# Patient Record
Sex: MALE | Race: ASIAN | NOT HISPANIC OR LATINO
[De-identification: names, ages, dates, MRNs, and addresses within clinical notes are randomized per-mention and may not be internally consistent; named-entity substitution may affect disease eponyms.]

---

## 2018-02-20 ENCOUNTER — APPOINTMENT (OUTPATIENT)
Dept: OTOLARYNGOLOGY | Facility: CLINIC | Age: 63
End: 2018-02-20

## 2019-04-02 ENCOUNTER — APPOINTMENT (OUTPATIENT)
Dept: OTOLARYNGOLOGY | Facility: CLINIC | Age: 64
End: 2019-04-02
Payer: COMMERCIAL

## 2019-04-02 VITALS
HEART RATE: 58 BPM | SYSTOLIC BLOOD PRESSURE: 126 MMHG | DIASTOLIC BLOOD PRESSURE: 67 MMHG | WEIGHT: 164 LBS | HEIGHT: 66 IN | BODY MASS INDEX: 26.36 KG/M2

## 2019-04-02 DIAGNOSIS — G47.30 SLEEP APNEA, UNSPECIFIED: ICD-10-CM

## 2019-04-02 DIAGNOSIS — Z86.39 PERSONAL HISTORY OF OTHER ENDOCRINE, NUTRITIONAL AND METABOLIC DISEASE: ICD-10-CM

## 2019-04-02 DIAGNOSIS — K21.9 GASTRO-ESOPHAGEAL REFLUX DISEASE W/OUT ESOPHAGITIS: ICD-10-CM

## 2019-04-02 DIAGNOSIS — R09.81 NASAL CONGESTION: ICD-10-CM

## 2019-04-02 DIAGNOSIS — R68.89 OTHER GENERAL SYMPTOMS AND SIGNS: ICD-10-CM

## 2019-04-02 DIAGNOSIS — Z83.3 FAMILY HISTORY OF DIABETES MELLITUS: ICD-10-CM

## 2019-04-02 DIAGNOSIS — R09.82 POSTNASAL DRIP: ICD-10-CM

## 2019-04-02 DIAGNOSIS — R06.83 SNORING: ICD-10-CM

## 2019-04-02 DIAGNOSIS — J30.9 ALLERGIC RHINITIS, UNSPECIFIED: ICD-10-CM

## 2019-04-02 PROCEDURE — 99204 OFFICE O/P NEW MOD 45 MIN: CPT | Mod: 25

## 2019-04-02 PROCEDURE — 31575 DIAGNOSTIC LARYNGOSCOPY: CPT

## 2019-04-02 RX ORDER — CHROMIUM 200 MCG
TABLET ORAL
Refills: 0 | Status: ACTIVE | COMMUNITY

## 2019-04-02 RX ORDER — METFORMIN HYDROCHLORIDE 1000 MG/1
TABLET, FILM COATED ORAL
Refills: 0 | Status: ACTIVE | COMMUNITY

## 2019-04-02 RX ORDER — GLIPIZIDE 2.5 MG/1
TABLET ORAL
Refills: 0 | Status: ACTIVE | COMMUNITY

## 2019-04-02 RX ORDER — ESCITALOPRAM OXALATE 5 MG/1
TABLET, FILM COATED ORAL
Refills: 0 | Status: ACTIVE | COMMUNITY

## 2019-04-02 RX ORDER — FLUTICASONE PROPIONATE 50 UG/1
50 SPRAY, METERED NASAL DAILY
Qty: 1 | Refills: 11 | Status: ACTIVE | COMMUNITY
Start: 2019-04-02 | End: 1900-01-01

## 2019-04-02 RX ORDER — ATORVASTATIN CALCIUM 80 MG/1
TABLET, FILM COATED ORAL
Refills: 0 | Status: ACTIVE | COMMUNITY

## 2019-04-02 NOTE — REVIEW OF SYSTEMS
[Nasal Congestion] : nasal congestion [Problem Snoring] : problem snoring [Snoring With Pauses] : snoring with pauses [As Noted in HPI] : as noted in HPI [Throat Clearing] : throat clearing [Throat Dryness] : throat dryness [Negative] : Heme/Lymph

## 2019-04-03 PROBLEM — J30.9 ALLERGIC RHINITIS: Status: ACTIVE | Noted: 2019-04-02

## 2019-04-03 PROBLEM — K21.9 LARYNGOPHARYNGEAL REFLUX (LPR): Status: ACTIVE | Noted: 2019-04-02

## 2019-04-03 PROBLEM — R68.89 CHRONIC THROAT CLEARING: Status: ACTIVE | Noted: 2019-04-02

## 2019-04-03 NOTE — PROCEDURE
[Globus] : globus [Topical Lidocaine] : topical lidocaine [Oxymetazoline HCl] : oxymetazoline HCl [Flexible Endoscope] : examined with the flexible endoscope [Serial Number: ___] : Serial Number: [unfilled] [Normal] : the false vocal folds were pink and regular, the ventricular sulcus was open, the true vocal folds were glistening white, tense and of equal length, mobility, and height [True Vocal Cords Paralysis] : no true vocal cord paralysis [True Vocal Cords Erythematous] : no true vocal cord edema [True Vocal Cords New's Nodules] : no true vocal cord nodules [Glottis Arytenoid Cartilages] : no arytenoid granulomas [Glottis Arytenoid Cartilages Erythema] : bilateral arytenoid ~M erythema [Arytenoid Edema ___ /4] : arytenoid edema [unfilled]U/4 [Arytenoid Erythema ___ /4] : arytenoid erythema [unfilled]U/4 [Interarytenoid Edema] : interarytenoid area edematous [Present] : absent

## 2019-04-03 NOTE — REASON FOR VISIT
[Initial Evaluation] : an initial evaluation for [Other: _____] : [unfilled] [FreeTextEntry2] : Referred by PCP Dr. Vance for chronic nasal congestion, difficulty breathing and post nasal drip

## 2019-04-03 NOTE — CONSULT LETTER
[Dear  ___] : Dear  [unfilled], [Consult Letter:] : I had the pleasure of evaluating your patient, [unfilled]. [Please see my note below.] : Please see my note below. [Consult Closing:] : Thank you very much for allowing me to participate in the care of this patient.  If you have any questions, please do not hesitate to contact me. [Sincerely,] : Sincerely, [FreeTextEntry2] : Dr. Genia Vance\par [address] [facility] [address] [FreeTextEntry3] : Jesse Aguayo MD, FACS\par Clinical \par Dept. of Otolaryngology\par [facility]\par

## 2019-04-03 NOTE — PHYSICAL EXAM
[] : septum deviated to the right [Midline] : trachea located in midline position [Laryngoscopy Performed] : laryngoscopy was performed, see procedure section for findings [Normal] : no rashes [de-identified] : Pale and edematous [de-identified] : 1+

## 2019-04-03 NOTE — HISTORY OF PRESENT ILLNESS
[de-identified] : 64 year old male referred by PCP Dr. Vance for chronic nasal congestion, difficulty breathing and post nasal drip.  Patient reports symptoms present for about 20 years, progressively getting worse.  Denies use of allergy medication and nasal sprays, with no relief.  Patient states acquired headache with use of nasal sprays, no longer using.  Patient states he clears throat multiple times for the day with dry cough and globus sensation.  Patient denies nasal discharge, sinus pain/pressure and sinus infections in past 6 months.  Reports CT scan of head in 2018.

## 2019-04-10 ENCOUNTER — APPOINTMENT (OUTPATIENT)
Dept: UROLOGY | Facility: CLINIC | Age: 64
End: 2019-04-10
Payer: COMMERCIAL

## 2019-04-10 VITALS
HEART RATE: 71 BPM | SYSTOLIC BLOOD PRESSURE: 135 MMHG | BODY MASS INDEX: 26.36 KG/M2 | HEIGHT: 66 IN | DIASTOLIC BLOOD PRESSURE: 62 MMHG | WEIGHT: 164 LBS

## 2019-04-10 PROCEDURE — 99203 OFFICE O/P NEW LOW 30 MIN: CPT

## 2019-04-18 LAB
LH SERPL-ACNC: 1.5 IU/L
PROLACTIN SERPL-MCNC: 9 NG/ML

## 2019-04-23 LAB
TESTOST BND SERPL-MCNC: 8.9 PG/ML
TESTOST SERPL-MCNC: 484.1 NG/DL

## 2019-04-23 NOTE — ASSESSMENT
[FreeTextEntry1] : Discussed etiology and management of erectile dysfunction. We discussed that etiology of ED may be multifactorial. \par We discussed treatment options including oral medication with [id number] (Cialis, Viagra, etc), Vacuum erectile device pump (ZAID), intraurethral suppository (MUSE), cavernosal injection therapy (Caverject, Trimix, etc), and lastly, surgical options (penile prosthesis). \par risks and benefits of each reviewed\par check hormone profile and f/u

## 2019-04-23 NOTE — HISTORY OF PRESENT ILLNESS
[FreeTextEntry1] : Cc ed\par Erectile Dysfunction\par Patient complains of erectile dysfunction. Onset of dysfunction was  years ago and was gradual in onset.  Patient states the nature of difficulty is both attaining and maintaining erection. Full erections occur never. Partial erections occur never. Libido is  affected. Risk factors for ED include antihypertensive medications, dm lexapro, hyperlipid. Patient denies history of cardiovascular disease a. Patient's expectations as to sexual function good.  . Previous treatment of ED includes none\par

## 2019-04-24 ENCOUNTER — APPOINTMENT (OUTPATIENT)
Dept: UROLOGY | Facility: CLINIC | Age: 64
End: 2019-04-24
Payer: COMMERCIAL

## 2019-04-24 VITALS — BODY MASS INDEX: 27 KG/M2 | HEIGHT: 66 IN | WEIGHT: 168 LBS

## 2019-04-24 VITALS — DIASTOLIC BLOOD PRESSURE: 68 MMHG | HEART RATE: 60 BPM | SYSTOLIC BLOOD PRESSURE: 121 MMHG

## 2019-04-24 PROCEDURE — 99213 OFFICE O/P EST LOW 20 MIN: CPT

## 2019-04-24 RX ORDER — SILDENAFIL 20 MG/1
20 TABLET ORAL
Qty: 30 | Refills: 1 | Status: ACTIVE | COMMUNITY
Start: 2019-04-24 | End: 1900-01-01

## 2019-04-24 NOTE — ASSESSMENT
[FreeTextEntry1] : reviewed labs \par Will try sildenafil side effects reviewed\par More common reviewed- redness or warmth in your face, neck, or chest; cold symptoms such as stuffy nose, sneezing, or sore throat; headache; memory problems; diarrhea, upset stomach; or muscle pain, back pain.\par Stop immediately and got to ER for changes in vision or sudden vision loss; ringing in your ears, or sudden hearing loss; chest pain or heavy feeling, pain spreading to the arm or shoulder, nausea, sweating, general ill feeling; irregular heartbeat; shortness of breath, swelling in your hands or feet; seizure (convulsions); feeling light-headed, fainting; or penis erection that is painful or lasts 4 hours or longer\par

## 2019-04-24 NOTE — HISTORY OF PRESENT ILLNESS
[FreeTextEntry1] : Cc ed\par Erectile Dysfunction\par Patient complains of erectile dysfunction. Onset of dysfunction was  years ago and was gradual in onset.  Patient states the nature of difficulty is both attaining and maintaining erection. Full erections occur never. Partial erections occur never. Libido is  affected. Risk factors for ED include antihypertensive medications, dm lexapro, hyperlipid. Patient denies history of cardiovascular disease a. Patient's expectations as to sexual function good.  . Previous treatment of ED includes none\par labs normal \par

## 2019-05-07 ENCOUNTER — APPOINTMENT (OUTPATIENT)
Dept: OTOLARYNGOLOGY | Facility: CLINIC | Age: 64
End: 2019-05-07

## 2020-03-29 ENCOUNTER — INPATIENT (INPATIENT)
Facility: HOSPITAL | Age: 65
LOS: 3 days | Discharge: HOME | End: 2020-04-02
Attending: INTERNAL MEDICINE | Admitting: INTERNAL MEDICINE
Payer: COMMERCIAL

## 2020-03-29 VITALS
RESPIRATION RATE: 22 BRPM | SYSTOLIC BLOOD PRESSURE: 162 MMHG | OXYGEN SATURATION: 96 % | DIASTOLIC BLOOD PRESSURE: 75 MMHG | TEMPERATURE: 103 F | HEART RATE: 89 BPM

## 2020-03-29 NOTE — ED ADULT TRIAGE NOTE - CHIEF COMPLAINT QUOTE
PT C/O fevers, cough x 1 week. Right sided chest pain radiating to right flank and back x 1 day. PHX: DM, HLD. Denies recent travel, states son at home has similar symptoms. PT C/O fevers, cough x 1 week. Right sided chest pain radiating to right flank and back x 1 day. PHX: DM, HLD. Denies recent travel, states son at home has similar symptoms.  Addendum: PT short of breath after ambulating/talking, having difficulty completing full sentences. Charge RN notified: Pt moved to Adult ED intake.

## 2020-03-30 DIAGNOSIS — E11.9 TYPE 2 DIABETES MELLITUS WITHOUT COMPLICATIONS: ICD-10-CM

## 2020-03-30 DIAGNOSIS — Z02.9 ENCOUNTER FOR ADMINISTRATIVE EXAMINATIONS, UNSPECIFIED: ICD-10-CM

## 2020-03-30 DIAGNOSIS — Z29.9 ENCOUNTER FOR PROPHYLACTIC MEASURES, UNSPECIFIED: ICD-10-CM

## 2020-03-30 DIAGNOSIS — E78.5 HYPERLIPIDEMIA, UNSPECIFIED: ICD-10-CM

## 2020-03-30 DIAGNOSIS — J06.9 ACUTE UPPER RESPIRATORY INFECTION, UNSPECIFIED: ICD-10-CM

## 2020-03-30 DIAGNOSIS — B34.9 VIRAL INFECTION, UNSPECIFIED: ICD-10-CM

## 2020-03-30 DIAGNOSIS — R74.0 NONSPECIFIC ELEVATION OF LEVELS OF TRANSAMINASE AND LACTIC ACID DEHYDROGENASE [LDH]: ICD-10-CM

## 2020-03-30 LAB
ALBUMIN SERPL ELPH-MCNC: 3.4 G/DL — SIGNIFICANT CHANGE UP (ref 3.3–5)
ALP SERPL-CCNC: 63 U/L — SIGNIFICANT CHANGE UP (ref 40–120)
ALT FLD-CCNC: 69 U/L — HIGH (ref 4–41)
ANION GAP SERPL CALC-SCNC: 12 MMO/L — SIGNIFICANT CHANGE UP (ref 7–14)
ANION GAP SERPL CALC-SCNC: 13 MMO/L — SIGNIFICANT CHANGE UP (ref 7–14)
AST SERPL-CCNC: 80 U/L — HIGH (ref 4–40)
B PERT DNA SPEC QL NAA+PROBE: NOT DETECTED — SIGNIFICANT CHANGE UP
BASE EXCESS BLDV CALC-SCNC: -0.9 MMOL/L — SIGNIFICANT CHANGE UP
BASE EXCESS BLDV CALC-SCNC: -1.3 MMOL/L — SIGNIFICANT CHANGE UP
BILIRUB SERPL-MCNC: 0.5 MG/DL — SIGNIFICANT CHANGE UP (ref 0.2–1.2)
BLOOD GAS VENOUS - CREATININE: 1.23 MG/DL — SIGNIFICANT CHANGE UP (ref 0.5–1.3)
BLOOD GAS VENOUS - CREATININE: 1.36 MG/DL — HIGH (ref 0.5–1.3)
BLOOD GAS VENOUS - FIO2: 36 — SIGNIFICANT CHANGE UP
BUN SERPL-MCNC: 15 MG/DL — SIGNIFICANT CHANGE UP (ref 7–23)
BUN SERPL-MCNC: 15 MG/DL — SIGNIFICANT CHANGE UP (ref 7–23)
C PNEUM DNA SPEC QL NAA+PROBE: NOT DETECTED — SIGNIFICANT CHANGE UP
CALCIUM SERPL-MCNC: 8.5 MG/DL — SIGNIFICANT CHANGE UP (ref 8.4–10.5)
CALCIUM SERPL-MCNC: 9.1 MG/DL — SIGNIFICANT CHANGE UP (ref 8.4–10.5)
CHLORIDE BLDV-SCNC: 106 MMOL/L — SIGNIFICANT CHANGE UP (ref 96–108)
CHLORIDE BLDV-SCNC: 108 MMOL/L — SIGNIFICANT CHANGE UP (ref 96–108)
CHLORIDE SERPL-SCNC: 103 MMOL/L — SIGNIFICANT CHANGE UP (ref 98–107)
CHLORIDE SERPL-SCNC: 98 MMOL/L — SIGNIFICANT CHANGE UP (ref 98–107)
CO2 SERPL-SCNC: 20 MMOL/L — LOW (ref 22–31)
CO2 SERPL-SCNC: 20 MMOL/L — LOW (ref 22–31)
CREAT SERPL-MCNC: 0.98 MG/DL — SIGNIFICANT CHANGE UP (ref 0.5–1.3)
CREAT SERPL-MCNC: 1.14 MG/DL — SIGNIFICANT CHANGE UP (ref 0.5–1.3)
FLUAV H1 2009 PAND RNA SPEC QL NAA+PROBE: NOT DETECTED — SIGNIFICANT CHANGE UP
FLUAV H1 RNA SPEC QL NAA+PROBE: NOT DETECTED — SIGNIFICANT CHANGE UP
FLUAV H3 RNA SPEC QL NAA+PROBE: NOT DETECTED — SIGNIFICANT CHANGE UP
FLUAV SUBTYP SPEC NAA+PROBE: NOT DETECTED — SIGNIFICANT CHANGE UP
FLUBV RNA SPEC QL NAA+PROBE: NOT DETECTED — SIGNIFICANT CHANGE UP
GAS PNL BLDV: 136 MMOL/L — SIGNIFICANT CHANGE UP (ref 136–146)
GAS PNL BLDV: 136 MMOL/L — SIGNIFICANT CHANGE UP (ref 136–146)
GLUCOSE BLDV-MCNC: 109 MG/DL — HIGH (ref 70–99)
GLUCOSE BLDV-MCNC: 116 MG/DL — HIGH (ref 70–99)
GLUCOSE SERPL-MCNC: 109 MG/DL — HIGH (ref 70–99)
GLUCOSE SERPL-MCNC: 116 MG/DL — HIGH (ref 70–99)
HADV DNA SPEC QL NAA+PROBE: NOT DETECTED — SIGNIFICANT CHANGE UP
HCO3 BLDV-SCNC: 22 MMOL/L — SIGNIFICANT CHANGE UP (ref 20–27)
HCO3 BLDV-SCNC: 22 MMOL/L — SIGNIFICANT CHANGE UP (ref 20–27)
HCOV PNL SPEC NAA+PROBE: SIGNIFICANT CHANGE UP
HCT VFR BLD CALC: 35.6 % — LOW (ref 39–50)
HCT VFR BLDV CALC: 32.9 % — LOW (ref 39–51)
HCT VFR BLDV CALC: 38.3 % — LOW (ref 39–51)
HGB BLD-MCNC: 11.9 G/DL — LOW (ref 13–17)
HGB BLDV-MCNC: 10.7 G/DL — LOW (ref 13–17)
HGB BLDV-MCNC: 12.4 G/DL — LOW (ref 13–17)
HMPV RNA SPEC QL NAA+PROBE: NOT DETECTED — SIGNIFICANT CHANGE UP
HPIV1 RNA SPEC QL NAA+PROBE: NOT DETECTED — SIGNIFICANT CHANGE UP
HPIV2 RNA SPEC QL NAA+PROBE: NOT DETECTED — SIGNIFICANT CHANGE UP
HPIV3 RNA SPEC QL NAA+PROBE: NOT DETECTED — SIGNIFICANT CHANGE UP
HPIV4 RNA SPEC QL NAA+PROBE: NOT DETECTED — SIGNIFICANT CHANGE UP
LACTATE BLDV-MCNC: 1.6 MMOL/L — SIGNIFICANT CHANGE UP (ref 0.5–2)
LACTATE BLDV-MCNC: 2.7 MMOL/L — HIGH (ref 0.5–2)
MAGNESIUM SERPL-MCNC: 1.9 MG/DL — SIGNIFICANT CHANGE UP (ref 1.6–2.6)
MCHC RBC-ENTMCNC: 29.6 PG — SIGNIFICANT CHANGE UP (ref 27–34)
MCHC RBC-ENTMCNC: 33.4 % — SIGNIFICANT CHANGE UP (ref 32–36)
MCV RBC AUTO: 88.6 FL — SIGNIFICANT CHANGE UP (ref 80–100)
NRBC # FLD: 0 K/UL — SIGNIFICANT CHANGE UP (ref 0–0)
PCO2 BLDV: 43 MMHG — SIGNIFICANT CHANGE UP (ref 41–51)
PCO2 BLDV: 49 MMHG — SIGNIFICANT CHANGE UP (ref 41–51)
PH BLDV: 7.32 PH — SIGNIFICANT CHANGE UP (ref 7.32–7.43)
PH BLDV: 7.36 PH — SIGNIFICANT CHANGE UP (ref 7.32–7.43)
PHOSPHATE SERPL-MCNC: 2.2 MG/DL — LOW (ref 2.5–4.5)
PLATELET # BLD AUTO: 156 K/UL — SIGNIFICANT CHANGE UP (ref 150–400)
PMV BLD: 11.6 FL — SIGNIFICANT CHANGE UP (ref 7–13)
PO2 BLDV: 31 MMHG — LOW (ref 35–40)
PO2 BLDV: < 24 MMHG — LOW (ref 35–40)
POTASSIUM BLDV-SCNC: 3.8 MMOL/L — SIGNIFICANT CHANGE UP (ref 3.4–4.5)
POTASSIUM BLDV-SCNC: 3.9 MMOL/L — SIGNIFICANT CHANGE UP (ref 3.4–4.5)
POTASSIUM SERPL-MCNC: 4.2 MMOL/L — SIGNIFICANT CHANGE UP (ref 3.5–5.3)
POTASSIUM SERPL-MCNC: 6 MMOL/L — HIGH (ref 3.5–5.3)
POTASSIUM SERPL-SCNC: 4.2 MMOL/L — SIGNIFICANT CHANGE UP (ref 3.5–5.3)
POTASSIUM SERPL-SCNC: 6 MMOL/L — HIGH (ref 3.5–5.3)
PROT SERPL-MCNC: 6.5 G/DL — SIGNIFICANT CHANGE UP (ref 6–8.3)
RBC # BLD: 4.02 M/UL — LOW (ref 4.2–5.8)
RBC # FLD: 11.9 % — SIGNIFICANT CHANGE UP (ref 10.3–14.5)
RSV RNA SPEC QL NAA+PROBE: NOT DETECTED — SIGNIFICANT CHANGE UP
RV+EV RNA SPEC QL NAA+PROBE: NOT DETECTED — SIGNIFICANT CHANGE UP
SAO2 % BLDV: 35 % — LOW (ref 60–85)
SAO2 % BLDV: 50.8 % — LOW (ref 60–85)
SARS-COV-2 RNA SPEC QL NAA+PROBE: DETECTED
SODIUM SERPL-SCNC: 131 MMOL/L — LOW (ref 135–145)
SODIUM SERPL-SCNC: 135 MMOL/L — SIGNIFICANT CHANGE UP (ref 135–145)
WBC # BLD: 8.31 K/UL — SIGNIFICANT CHANGE UP (ref 3.8–10.5)
WBC # FLD AUTO: 8.31 K/UL — SIGNIFICANT CHANGE UP (ref 3.8–10.5)

## 2020-03-30 PROCEDURE — 71045 X-RAY EXAM CHEST 1 VIEW: CPT | Mod: 26

## 2020-03-30 PROCEDURE — 99221 1ST HOSP IP/OBS SF/LOW 40: CPT

## 2020-03-30 RX ORDER — ACETAMINOPHEN 500 MG
975 TABLET ORAL ONCE
Refills: 0 | Status: COMPLETED | OUTPATIENT
Start: 2020-03-30 | End: 2020-03-30

## 2020-03-30 RX ORDER — INSULIN LISPRO 100/ML
VIAL (ML) SUBCUTANEOUS AT BEDTIME
Refills: 0 | Status: DISCONTINUED | OUTPATIENT
Start: 2020-03-30 | End: 2020-04-02

## 2020-03-30 RX ORDER — ACETAMINOPHEN 500 MG
650 TABLET ORAL EVERY 6 HOURS
Refills: 0 | Status: DISCONTINUED | OUTPATIENT
Start: 2020-03-30 | End: 2020-04-02

## 2020-03-30 RX ORDER — ACETAMINOPHEN 500 MG
650 TABLET ORAL ONCE
Refills: 0 | Status: COMPLETED | OUTPATIENT
Start: 2020-03-30 | End: 2020-03-30

## 2020-03-30 RX ORDER — ENOXAPARIN SODIUM 100 MG/ML
40 INJECTION SUBCUTANEOUS EVERY 24 HOURS
Refills: 0 | Status: DISCONTINUED | OUTPATIENT
Start: 2020-03-30 | End: 2020-04-02

## 2020-03-30 RX ORDER — DEXTROSE 50 % IN WATER 50 %
12.5 SYRINGE (ML) INTRAVENOUS ONCE
Refills: 0 | Status: DISCONTINUED | OUTPATIENT
Start: 2020-03-30 | End: 2020-04-02

## 2020-03-30 RX ORDER — METFORMIN HYDROCHLORIDE 850 MG/1
1 TABLET ORAL
Qty: 0 | Refills: 0 | DISCHARGE

## 2020-03-30 RX ORDER — INSULIN LISPRO 100/ML
VIAL (ML) SUBCUTANEOUS
Refills: 0 | Status: DISCONTINUED | OUTPATIENT
Start: 2020-03-30 | End: 2020-04-02

## 2020-03-30 RX ORDER — DEXTROSE 50 % IN WATER 50 %
25 SYRINGE (ML) INTRAVENOUS ONCE
Refills: 0 | Status: DISCONTINUED | OUTPATIENT
Start: 2020-03-30 | End: 2020-04-02

## 2020-03-30 RX ORDER — SODIUM CHLORIDE 9 MG/ML
1000 INJECTION, SOLUTION INTRAVENOUS
Refills: 0 | Status: DISCONTINUED | OUTPATIENT
Start: 2020-03-30 | End: 2020-04-02

## 2020-03-30 RX ORDER — FENOFIBRATE,MICRONIZED 130 MG
0 CAPSULE ORAL
Qty: 0 | Refills: 0 | DISCHARGE

## 2020-03-30 RX ORDER — ESCITALOPRAM OXALATE 10 MG/1
1 TABLET, FILM COATED ORAL
Qty: 0 | Refills: 0 | DISCHARGE

## 2020-03-30 RX ORDER — DEXTROSE 50 % IN WATER 50 %
15 SYRINGE (ML) INTRAVENOUS ONCE
Refills: 0 | Status: DISCONTINUED | OUTPATIENT
Start: 2020-03-30 | End: 2020-04-02

## 2020-03-30 RX ORDER — SODIUM CHLORIDE 9 MG/ML
1000 INJECTION INTRAMUSCULAR; INTRAVENOUS; SUBCUTANEOUS ONCE
Refills: 0 | Status: COMPLETED | OUTPATIENT
Start: 2020-03-30 | End: 2020-03-30

## 2020-03-30 RX ORDER — GLUCAGON INJECTION, SOLUTION 0.5 MG/.1ML
1 INJECTION, SOLUTION SUBCUTANEOUS ONCE
Refills: 0 | Status: DISCONTINUED | OUTPATIENT
Start: 2020-03-30 | End: 2020-04-02

## 2020-03-30 RX ORDER — ESCITALOPRAM OXALATE 10 MG/1
10 TABLET, FILM COATED ORAL DAILY
Refills: 0 | Status: DISCONTINUED | OUTPATIENT
Start: 2020-03-30 | End: 2020-04-02

## 2020-03-30 RX ADMIN — Medication 100 MILLIGRAM(S): at 23:26

## 2020-03-30 RX ADMIN — SODIUM CHLORIDE 1000 MILLILITER(S): 9 INJECTION INTRAMUSCULAR; INTRAVENOUS; SUBCUTANEOUS at 08:53

## 2020-03-30 RX ADMIN — Medication 650 MILLIGRAM(S): at 02:05

## 2020-03-30 RX ADMIN — SODIUM CHLORIDE 1000 MILLILITER(S): 9 INJECTION INTRAMUSCULAR; INTRAVENOUS; SUBCUTANEOUS at 04:50

## 2020-03-30 NOTE — H&P ADULT - PROBLEM SELECTOR PLAN 3
Holding home oral agents, ISS and glucose checks at this time Uncertain of home fenofibrate dose, pt attempting to clarify, resume once dose clarified.    #Depression/anxiety: No acute issues, continue home escitalopram 10mg qd.

## 2020-03-30 NOTE — ED PROVIDER NOTE - CLINICAL SUMMARY MEDICAL DECISION MAKING FREE TEXT BOX
64 y/o M with DM, HLD presenting with flu like symptoms, x 1 week. on presentations patient w/ low grade fever, rest of vitals stable, walking and resting O2 sat >96%. patient has intense coughing bouts, making it difficult to talk. plan for routine labs including COVID, CXR,, cough suppressant and reassessment

## 2020-03-30 NOTE — H&P ADULT - PROBLEM SELECTOR PLAN 1
Presented with fever, dry cough. CXR clear. RVP negative.  - COVID19 sent, follow up result  - Hemodynamically stable, not requiring supplemental O2, continue to monitor

## 2020-03-30 NOTE — H&P ADULT - HISTORY OF PRESENT ILLNESS
65 year old male with DM2, HLD presents to the ED with fever and cough for about 4 days. Patient reports sick contacts at his home with URI symptoms but uncertain if they have COVID or flu or other respiratory illness. Currently denies shortness of breath, complains of continued intermittent nonproductive cough.     ED course:  1L NS bolus, acetaminophen

## 2020-03-30 NOTE — H&P ADULT - PROBLEM SELECTOR PLAN 6
Transitions of Care Status:  1.  Name of PCP: Dr. Vance  2.  PCP Contacted on Admission: [ ] Y    [x] N    3.  PCP contacted at Discharge: [ ] Y    [ ] N    [ ] N/A  4.  Post-Discharge Appointment Date and Location:  5.  Summary of Handoff given to PCP:

## 2020-03-30 NOTE — ED ADULT NURSE REASSESSMENT NOTE - NS ED NURSE REASSESS COMMENT FT1
pt received in CDU 7 per night shift RN. upon assessment pt AOx4, ambulatory, v/s stable, bilateral upper and lower extremities strength equal, no pitting edema present,  abdomen tender, pt denies pain at rest. ambulated pt approximately 100 ft and O2 maintained at 95-96% until last 5 step pt O2 at 94% room air. safety measures in place. will continue to monitor. pt received in CDU 7 per night shift RN. upon assessment pt AOx4, ambulatory, v/s stable, breathing equal and un labored, bilateral upper and lower extremities strength equal, no pitting edema present,  abdomen tender, pt denies pain at rest. ambulated pt approximately 100 ft and O2 maintained at 95-96% until last 5 step pt O2 at 94% room air. safety measures in place. will continue to monitor. pt received in CDU 7 per night shift RN. upon assessment pt AOx4, ambulatory, PMH asthma, HLD, DM. pt  v/s stable, breathing equal and un labored, bilateral upper and lower extremities strength equal, no pitting edema present,  abdomen tender, pt denies pain at rest. ambulated pt approximately 100 ft and O2 maintained at 95-96% until last 5 step pt O2 at 94% room air. safety measures in place. will continue to monitor. pt received in CDU 7 per night shift RN. upon assessment pt AOx4, ambulatory, PMH asthma, HLD, DM. pt  v/s stable, breathing equal and un labored, bilateral posterior upper lungs clear, posterior right lower diminished, posterior lower Left rhonchi on auscultation. bilateral upper and lower extremities strength equal, no pitting edema present,  abdomen tender, pt denies pain at rest. ambulated pt approximately 100 ft and O2 maintained at 95-96% until last 5 step pt O2 at 94% room air. safety measures in place. will continue to monitor.

## 2020-03-30 NOTE — ED PROVIDER NOTE - OBJECTIVE STATEMENT
patient is a 66 y/o M with pmhx of fever, right sided chest pain that radiates to his back that is aggravated by cough x few days. He denies recent travel or sick contacts, but endorses everyone in his house is sick. He denies chest pain at rest, HA, dizziness, palpitation, diaphoresis, SOB, abd pain n/v.

## 2020-03-30 NOTE — H&P ADULT - PROBLEM SELECTOR PLAN 5
Transitions of Care Status:  1.  Name of PCP: Dr. Vance  2.  PCP Contacted on Admission: [ ] Y    [x] N    3.  PCP contacted at Discharge: [ ] Y    [ ] N    [ ] N/A  4.  Post-Discharge Appointment Date and Location:  5.  Summary of Handoff given to PCP: Lovenox subcutaneous

## 2020-03-30 NOTE — H&P ADULT - NSHPREVIEWOFSYSTEMS_GEN_ALL_CORE
Constitutional: no recent weight changes, fevers, night sweats or fatigue  Dermatologic: no lesions or rashes.  HEENT: denies visual changes, hearing changes, rhinitis, odynophagia, or dysphagia  Cardiovascular: denies palpitations, chest pain, edema  Respiratory: denies SOB, wheezing  Gastrointestinal: denies N/V/D, abdominal pain, hematochezia, melena  : denies dysuria, hematuria  MSK: denies weakness, joint pain  Endocrine: no cold or heat intolerance or excessive thirst or urination  Hematologic: no excessive bleeding or clotting  Neurologic: no headaches, vision changes, dizziness, fainting or numbness, tingling or weakness

## 2020-03-30 NOTE — H&P ADULT - PROBLEM SELECTOR PLAN 2
Uncertain of home fenofibrate dose, pt attempting to clarify, resume once dose clarified.    #Depression/anxiety: No acute issues, continue home escitalopram 10mg qd. Hepatocellular pattern, AST 80, ALT 69. No abdominal discomfort. No prior lab for review. Suspect possibly 2/2 viral illness above. Continue to monitor.

## 2020-03-30 NOTE — H&P ADULT - ASSESSMENT
65 year old male with DM2, HLD presents to the ED with fever and cough for about 4 days. COVID19 result pending.

## 2020-03-30 NOTE — ED ADULT NURSE NOTE - CHIEF COMPLAINT QUOTE
PT C/O fevers, cough x 1 week. Right sided chest pain radiating to right flank and back x 1 day. PHX: DM, HLD. Denies recent travel, states son at home has similar symptoms.  Addendum: PT short of breath after ambulating/talking, having difficulty completing full sentences. Charge RN notified: Pt moved to Adult ED intake.

## 2020-03-30 NOTE — H&P ADULT - NSHPPHYSICALEXAM_GEN_ALL_CORE
PHYSICAL EXAM:  Vital Signs Last 24 Hrs  T(C): 37.1 (30 Mar 2020 13:48), Max: 39.5 (29 Mar 2020 21:09)  T(F): 98.8 (30 Mar 2020 13:48), Max: 103.1 (29 Mar 2020 21:09)  HR: 87 (30 Mar 2020 13:48) (61 - 89)  BP: 117/63 (30 Mar 2020 13:48) (111/60 - 171/82)  BP(mean): --  RR: 22 (30 Mar 2020 13:48) (20 - 26)  SpO2: 97% (30 Mar 2020 13:48) (96% - 99%)    CONSTITUTIONAL: Sitting up in bed, NAD  CARDIAC: Normal rate, regular rhythm.  Heart sounds S1, S2.  No murmurs, rubs or gallops.  RESPIRATORY: Breath sounds clear and equal bilaterally.  GASTROINTESTINAL: Abdomen soft, non-tender, no guarding.  MUSCULOSKELETAL: Spine appears normal, range of motion is not limited, no muscle or joint tenderness  NEUROLOGICAL: Alert and oriented, no focal deficits, no motor or sensory deficits.

## 2020-03-30 NOTE — PATIENT PROFILE ADULT - NSPROPTRIGHTCAREGIVER_GEN_A_NUR
Subjective   Marisol Carrillo is a 47 y.o. female.     History of Present Illness   Marisol Carrillo 47 y.o. female who presents today for routine follow up check and medication refills.  she has a history of   Patient Active Problem List   Diagnosis   • Essential familial hypercholesterolemia   • Hypertension   • Hypothyroidism   • Adiposity   • Vitamin deficiency   • Impaired fasting glucose   • Vitamin D deficiency   • Lumbosacral radiculopathy   • Gastroesophageal reflux disease   • Constipation   • Diarrhea   • Dysphagia   • Fatigue   • Heartburn   • Lumbar disc herniation with radiculopathy   • Nocturnal cough   • Pain in extremity   • Regurgitation   • Vomiting   • Anxiety   • Depression   • Degeneration of lumbar intervertebral disc   • Spinal headache   • Chronic bilateral low back pain with bilateral sciatica   • Disc disease, degenerative, lumbar or lumbosacral   .  Since the last visit, she has overall felt depressed.  She has Hypertenision and is well controlled on medication and Hyperlipidemia and is well controlled on medication.  she has been compliant with current medications have reviewed them.  The patient denies medication side effects.    I will update my labs with the pre-op with DR Reeves for total knee replacement.    bp is still controlled and so is heart rate    I will update lipids  Marisol Carrillo female 47 y.o. who presents today for follow up of Depression and Anxiety.  She reports depressed mood, anhedonia, impaired concentration, excessive worry, unable to relax and irritable. Onset of symptoms was approximately several years ago.  She denies current suicidal and homicidal ideation. Risk factors are family history of anxiety and or depression and lifestyle of multiple roles.  Previous treatment includes current Rx.  She complains of the following medication side effects: none.  The patient is currently in counseling..    Will go up to 200mg Zoloft and did initially help when went to  150mg    The following portions of the patient's history were reviewed and updated as appropriate: allergies, current medications, past family history, past medical history, past social history, past surgical history and problem list.    Review of Systems   Constitutional: Positive for fatigue. Negative for activity change, appetite change and unexpected weight change.   HENT: Negative for nosebleeds and trouble swallowing.    Eyes: Negative for pain and visual disturbance.   Respiratory: Negative for chest tightness, shortness of breath and wheezing.    Cardiovascular: Negative for chest pain and palpitations.   Gastrointestinal: Negative for abdominal pain and blood in stool.   Endocrine: Negative.    Genitourinary: Negative for difficulty urinating and hematuria.   Musculoskeletal: Positive for arthralgias, back pain, gait problem, joint swelling and myalgias.   Skin: Negative for color change and rash.   Allergic/Immunologic: Negative.    Neurological: Negative for syncope and speech difficulty.   Hematological: Negative for adenopathy.   Psychiatric/Behavioral: Positive for agitation, decreased concentration, dysphoric mood and sleep disturbance. Negative for confusion. The patient is nervous/anxious.    All other systems reviewed and are negative.      Objective   Physical Exam   Constitutional: She is oriented to person, place, and time. She appears well-developed and well-nourished. No distress.   HENT:   Head: Normocephalic and atraumatic.   Eyes: Conjunctivae and EOM are normal. Pupils are equal, round, and reactive to light. Right eye exhibits no discharge. Left eye exhibits no discharge. No scleral icterus.   Neck: Normal range of motion. Neck supple. No tracheal deviation present. No thyromegaly present.   Cardiovascular: Normal rate, regular rhythm, normal heart sounds, intact distal pulses and normal pulses.  Exam reveals no gallop.    No murmur heard.  Pulmonary/Chest: Effort normal and breath sounds  normal. No respiratory distress. She has no wheezes. She has no rales.   Musculoskeletal: Normal range of motion.   Neurological: She is alert and oriented to person, place, and time. She exhibits normal muscle tone. Coordination normal.   Skin: Skin is warm. No rash noted. No erythema. No pallor.   Psychiatric: She has a normal mood and affect. Her behavior is normal. Judgment and thought content normal.   Nursing note and vitals reviewed.      Assessment/Plan   Problems Addressed this Visit        Cardiovascular and Mediastinum    Essential familial hypercholesterolemia    Relevant Orders    Comprehensive Metabolic Panel    CBC & Differential    T4, Free    TSH    Vitamin D 25 Hydroxy    Lipid Panel    Hemoglobin A1c    Hypertension - Primary    Relevant Medications    amLODIPine-valsartan (EXFORGE)  MG per tablet    nebivolol (BYSTOLIC) 5 MG tablet    Other Relevant Orders    Comprehensive Metabolic Panel    CBC & Differential    T4, Free    TSH    Vitamin D 25 Hydroxy    Lipid Panel    Hemoglobin A1c       Digestive    Vitamin D deficiency    Relevant Orders    Comprehensive Metabolic Panel    CBC & Differential    T4, Free    TSH    Vitamin D 25 Hydroxy    Lipid Panel    Hemoglobin A1c       Endocrine    Hypothyroidism    Relevant Medications    nebivolol (BYSTOLIC) 5 MG tablet    Other Relevant Orders    Comprehensive Metabolic Panel    CBC & Differential    T4, Free    TSH    Vitamin D 25 Hydroxy    Lipid Panel    Hemoglobin A1c    Impaired fasting glucose    Relevant Orders    Comprehensive Metabolic Panel    CBC & Differential    T4, Free    TSH    Vitamin D 25 Hydroxy    Lipid Panel    Hemoglobin A1c       Other    Anxiety    Relevant Orders    Comprehensive Metabolic Panel    CBC & Differential    T4, Free    TSH    Vitamin D 25 Hydroxy    Lipid Panel    Hemoglobin A1c    Depression    Relevant Medications    sertraline (ZOLOFT) 100 MG tablet    Other Relevant Orders    Comprehensive Metabolic Panel     CBC & Differential    T4, Free    TSH    Vitamin D 25 Hydroxy    Lipid Panel    Hemoglobin A1c                  declines

## 2020-03-30 NOTE — H&P ADULT - NSHPLABSRESULTS_GEN_ALL_CORE
LABS:                        11.9   8.31  )-----------( 156      ( 30 Mar 2020 01:20 )             35.6     03-30    135  |  103  |  15  ----------------------------<  109<H>  4.2   |  20<L>  |  0.98    Ca    8.5      30 Mar 2020 06:40  Phos  2.2     03-30  Mg     1.9     03-30    TPro  6.5  /  Alb  3.4  /  TBili  0.5  /  DBili  x   /  AST  80<H>  /  ALT  69<H>  /  AlkPhos  63  03-30    < from: Xray Chest 1 View- PORTABLE-Routine (03.30.20 @ 03:09) >  EXAM:  XR CHEST PORTABLE ROUTINE 1V    PROCEDURE DATE:  Mar 30 2020   INTERPRETATION:  CLINICAL INFORMATION: Chest pain and shortness of breath.  EXAM: 1 view of the chest.  COMPARISON: Chest radiograph from 8/26/2008  FINDINGS:  Clear lungs. No pleural effusions or pneumothorax. The cardiomediastinal silhouette is poorly evaluated on this projection. No acute osseous findings.  IMPRESSION:  Clear lungs.  CHANDLER ABDI M.D., RADIOLOGY RESIDENT  This document has been electronically signed.  JAMA MARTI M.D., ATTENDING RADIOLOGIST  This document has been electronically signed. Mar 30 2020  6:26AM    < end of copied text >

## 2020-03-30 NOTE — ED PROVIDER NOTE - PROGRESS NOTE DETAILS
krishan miller: received sign out from KRISHAN Cruz to follow up repeat labs and reassess. Assessed ot bedside this morning. states has had fever and cough for the last week with 2 days of R sided chest pain. Pt with persistent cough, was febrile to 103 on presentation. Walking sat 94%. Discussed with Dr. Oakley will admit pt for further treatment. spoke with Dr. Beckham who accepted pt, RVP added on. Pt stable.

## 2020-03-31 ENCOUNTER — TRANSCRIPTION ENCOUNTER (OUTPATIENT)
Age: 65
End: 2020-03-31

## 2020-03-31 LAB
ALBUMIN SERPL ELPH-MCNC: 3.8 G/DL — SIGNIFICANT CHANGE UP (ref 3.3–5)
ALP SERPL-CCNC: 79 U/L — SIGNIFICANT CHANGE UP (ref 40–120)
ALT FLD-CCNC: 60 U/L — HIGH (ref 4–41)
ANION GAP SERPL CALC-SCNC: 11 MMO/L — SIGNIFICANT CHANGE UP (ref 7–14)
AST SERPL-CCNC: 67 U/L — HIGH (ref 4–40)
BASOPHILS # BLD AUTO: 0.02 K/UL — SIGNIFICANT CHANGE UP (ref 0–0.2)
BASOPHILS NFR BLD AUTO: 0.3 % — SIGNIFICANT CHANGE UP (ref 0–2)
BILIRUB SERPL-MCNC: 0.5 MG/DL — SIGNIFICANT CHANGE UP (ref 0.2–1.2)
BUN SERPL-MCNC: 22 MG/DL — SIGNIFICANT CHANGE UP (ref 7–23)
CALCIUM SERPL-MCNC: 9 MG/DL — SIGNIFICANT CHANGE UP (ref 8.4–10.5)
CHLORIDE SERPL-SCNC: 104 MMOL/L — SIGNIFICANT CHANGE UP (ref 98–107)
CO2 SERPL-SCNC: 24 MMOL/L — SIGNIFICANT CHANGE UP (ref 22–31)
CREAT SERPL-MCNC: 1.29 MG/DL — SIGNIFICANT CHANGE UP (ref 0.5–1.3)
EOSINOPHIL # BLD AUTO: 0.08 K/UL — SIGNIFICANT CHANGE UP (ref 0–0.5)
EOSINOPHIL NFR BLD AUTO: 1.2 % — SIGNIFICANT CHANGE UP (ref 0–6)
GLUCOSE BLDC GLUCOMTR-MCNC: 110 MG/DL — HIGH (ref 70–99)
GLUCOSE BLDC GLUCOMTR-MCNC: 147 MG/DL — HIGH (ref 70–99)
GLUCOSE SERPL-MCNC: 99 MG/DL — SIGNIFICANT CHANGE UP (ref 70–99)
HBA1C BLD-MCNC: 6.4 % — HIGH (ref 4–5.6)
HCT VFR BLD CALC: 33.5 % — LOW (ref 39–50)
HCV AB S/CO SERPL IA: 0.19 S/CO — SIGNIFICANT CHANGE UP (ref 0–0.99)
HCV AB SERPL-IMP: SIGNIFICANT CHANGE UP
HGB BLD-MCNC: 10.7 G/DL — LOW (ref 13–17)
IMM GRANULOCYTES NFR BLD AUTO: 0.3 % — SIGNIFICANT CHANGE UP (ref 0–1.5)
LYMPHOCYTES # BLD AUTO: 1.97 K/UL — SIGNIFICANT CHANGE UP (ref 1–3.3)
LYMPHOCYTES # BLD AUTO: 30 % — SIGNIFICANT CHANGE UP (ref 13–44)
MCHC RBC-ENTMCNC: 28.7 PG — SIGNIFICANT CHANGE UP (ref 27–34)
MCHC RBC-ENTMCNC: 31.9 % — LOW (ref 32–36)
MCV RBC AUTO: 89.8 FL — SIGNIFICANT CHANGE UP (ref 80–100)
MONOCYTES # BLD AUTO: 0.46 K/UL — SIGNIFICANT CHANGE UP (ref 0–0.9)
MONOCYTES NFR BLD AUTO: 7 % — SIGNIFICANT CHANGE UP (ref 2–14)
NEUTROPHILS # BLD AUTO: 4.02 K/UL — SIGNIFICANT CHANGE UP (ref 1.8–7.4)
NEUTROPHILS NFR BLD AUTO: 61.2 % — SIGNIFICANT CHANGE UP (ref 43–77)
NRBC # FLD: 0 K/UL — SIGNIFICANT CHANGE UP (ref 0–0)
PLATELET # BLD AUTO: 145 K/UL — LOW (ref 150–400)
PMV BLD: 11.6 FL — SIGNIFICANT CHANGE UP (ref 7–13)
POTASSIUM SERPL-MCNC: 4.4 MMOL/L — SIGNIFICANT CHANGE UP (ref 3.5–5.3)
POTASSIUM SERPL-SCNC: 4.4 MMOL/L — SIGNIFICANT CHANGE UP (ref 3.5–5.3)
PROT SERPL-MCNC: 7.1 G/DL — SIGNIFICANT CHANGE UP (ref 6–8.3)
RBC # BLD: 3.73 M/UL — LOW (ref 4.2–5.8)
RBC # FLD: 11.9 % — SIGNIFICANT CHANGE UP (ref 10.3–14.5)
SODIUM SERPL-SCNC: 139 MMOL/L — SIGNIFICANT CHANGE UP (ref 135–145)
WBC # BLD: 6.57 K/UL — SIGNIFICANT CHANGE UP (ref 3.8–10.5)
WBC # FLD AUTO: 6.57 K/UL — SIGNIFICANT CHANGE UP (ref 3.8–10.5)

## 2020-03-31 RX ORDER — HYDROXYCHLOROQUINE SULFATE 200 MG
TABLET ORAL
Refills: 0 | Status: DISCONTINUED | OUTPATIENT
Start: 2020-03-31 | End: 2020-04-02

## 2020-03-31 RX ORDER — HYDROXYCHLOROQUINE SULFATE 200 MG
400 TABLET ORAL EVERY 12 HOURS
Refills: 0 | Status: COMPLETED | OUTPATIENT
Start: 2020-03-31 | End: 2020-04-01

## 2020-03-31 RX ORDER — HYDROXYCHLOROQUINE SULFATE 200 MG
200 TABLET ORAL EVERY 12 HOURS
Refills: 0 | Status: DISCONTINUED | OUTPATIENT
Start: 2020-04-01 | End: 2020-04-02

## 2020-03-31 RX ADMIN — ENOXAPARIN SODIUM 40 MILLIGRAM(S): 100 INJECTION SUBCUTANEOUS at 05:02

## 2020-03-31 RX ADMIN — ESCITALOPRAM OXALATE 10 MILLIGRAM(S): 10 TABLET, FILM COATED ORAL at 12:30

## 2020-03-31 RX ADMIN — Medication 400 MILLIGRAM(S): at 20:47

## 2020-03-31 RX ADMIN — Medication 100 MILLIGRAM(S): at 21:22

## 2020-03-31 NOTE — PROGRESS NOTE ADULT - ASSESSMENT
65 year old male with DM2, HLD presents to the ED with fever and cough for about 4 days. COVID19 result pending.      Problem/Plan - 1:  ·  Problem: Acute respiratory failure with Hypoxia .  Plan: Oxygen NC  . RVP negative.  - COVID19 positive .   - Hemodynamically stable, not requiring supplemental O2, continue to monitor.      Problem/Plan - 2:  ·  Problem: Transaminitis. Plan: Hepatocellular pattern, AST 80, ALT 69. No abdominal discomfort. No prior lab for review. Suspect possibly 2/2 viral illness above. Continue to monitor     Problem/Plan - 3:  ·  Problem: HLD (hyperlipidemia). Plan: Uncertain of home fenofibrate dose, pt attempting to clarify, resume once dose clarified.    #Depression/anxiety: No acute issues, continue home escitalopram 10mg qd.     Problem/Plan - 4:  ·  Problem: DM (diabetes mellitus). Plan: Holding home oral agents, ISS and glucose checks at this time.     Problem/Plan - 5:  ·  Problem: DVT prophylaxis. Plan: Lovenox subcutaneous.     Problem/Plan - 6:  Problem: Discharge planning issues. Plan: Transitions of Care Status:

## 2020-03-31 NOTE — DISCHARGE NOTE PROVIDER - CARE PROVIDER_API CALL
LAZARO Vance)  Internal Medicine  [address] [place] [address]  Phone: [phone]  Fax: [phone]  Follow Up Time:

## 2020-03-31 NOTE — DISCHARGE NOTE PROVIDER - INSTRUCTIONS
diabetic, low sodium, low cholesterol diet, increase fluid intake Low salt, low fat and low cholesterol diet   Carbohydrate controlled diabetes diet

## 2020-03-31 NOTE — DISCHARGE NOTE PROVIDER - HOSPITAL COURSE
65 year old male with DM2, HLD presents to the ED with fever and cough for about 4 days. Patient reports sick contacts at his home with URI symptoms but uncertain if they have COVID or flu or other respiratory illness. Currently denies shortness of breath, complains of continued intermittent nonproductive cough.         In ED was given 1L NS bolus, acetaminophen, underwent a CXR revealing Clear lungs. No pleural effusions or pneumothorax. The cardiomediastinal silhouette is poorly evaluated on this projection. No acute osseous findings.        has remained afebrile. 65 year old male with DM2, HLD presents to the ED with fever and cough for about 4 days. Found to be COVID positive. Started on Plaquenil and will complete course as an outpatient. Required supplemental O2 by NC and was successfully weaned to room air.         Case discussed with Dr. Hendrix on 4/2/2020. The patient is medically stable for discharge and has appropriate follow up.

## 2020-03-31 NOTE — PROGRESS NOTE ADULT - SUBJECTIVE AND OBJECTIVE BOX
INTERVAL HPI/OVERNIGHT EVENTS: I feel okay but SOB .   Vital Signs Last 24 Hrs  T(C): 37.1 (31 Mar 2020 20:45), Max: 37.1 (31 Mar 2020 20:45)  T(F): 98.8 (31 Mar 2020 20:45), Max: 98.8 (31 Mar 2020 20:45)  HR: 65 (31 Mar 2020 20:45) (62 - 82)  BP: 131/69 (31 Mar 2020 20:45) (115/62 - 131/69)  BP(mean): --  RR: 19 (31 Mar 2020 20:45) (18 - 19)  SpO2: 98% (31 Mar 2020 20:45) (98% - 99%)  I&O's Summary    30 Mar 2020 07:01  -  31 Mar 2020 07:00  --------------------------------------------------------  IN: 0 mL / OUT: 200 mL / NET: -200 mL      MEDICATIONS  (STANDING):  benzonatate 100 milliGRAM(s) Oral three times a day  dextrose 5%. 1000 milliLiter(s) (50 mL/Hr) IV Continuous <Continuous>  dextrose 50% Injectable 12.5 Gram(s) IV Push once  dextrose 50% Injectable 25 Gram(s) IV Push once  dextrose 50% Injectable 25 Gram(s) IV Push once  enoxaparin Injectable 40 milliGRAM(s) SubCutaneous every 24 hours  escitalopram 10 milliGRAM(s) Oral daily  hydroxychloroquine   Oral   hydroxychloroquine 400 milliGRAM(s) Oral every 12 hours  insulin lispro (HumaLOG) corrective regimen sliding scale   SubCutaneous three times a day before meals  insulin lispro (HumaLOG) corrective regimen sliding scale   SubCutaneous at bedtime    MEDICATIONS  (PRN):  acetaminophen   Tablet .. 650 milliGRAM(s) Oral every 6 hours PRN Temp greater or equal to 38C (100.4F)  dextrose 40% Gel 15 Gram(s) Oral once PRN Blood Glucose LESS THAN 70 milliGRAM(s)/deciliter  glucagon  Injectable 1 milliGRAM(s) IntraMuscular once PRN Glucose LESS THAN 70 milligrams/deciliter  guaiFENesin   Syrup  (Sugar-Free) 100 milliGRAM(s) Oral every 6 hours PRN Cough    LABS:                        10.7   6.57  )-----------( 145      ( 31 Mar 2020 05:00 )             33.5     03-31    139  |  104  |  22  ----------------------------<  99  4.4   |  24  |  1.29    Ca    9.0      31 Mar 2020 05:00  Phos  2.2     03-30  Mg     1.9     03-30    TPro  7.1  /  Alb  3.8  /  TBili  0.5  /  DBili  x   /  AST  67<H>  /  ALT  60<H>  /  AlkPhos  79  03-31        CAPILLARY BLOOD GLUCOSE      POCT Blood Glucose.: 110 mg/dL (31 Mar 2020 22:30)  POCT Blood Glucose.: 147 mg/dL (31 Mar 2020 17:03)  POCT Blood Glucose.: 149 mg/dL (31 Mar 2020 12:04)  POCT Blood Glucose.: 97 mg/dL (31 Mar 2020 08:24)          REVIEW OF SYSTEMS:  CONSTITUTIONAL: No fever, weight loss, or fatigue  EYES: No eye pain, visual disturbances, or discharge  ENMT:  No difficulty hearing, tinnitus, vertigo; No sinus or throat pain  NECK: No pain or stiffness  RESPIRATORY: No cough, wheezing, chills or hemoptysis; No shortness of breath  CARDIOVASCULAR: No chest pain, palpitations, dizziness, or leg swelling  GASTROINTESTINAL: No abdominal or epigastric pain. No nausea, vomiting, or hematemesis; No diarrhea or constipation. No melena or hematochezia.  GENITOURINARY: No dysuria, frequency, hematuria, or incontinence  NEUROLOGICAL: No headaches, memory loss, loss of strength, numbness, or tremors      Consultant(s) Notes Reviewed:  [x ] YES  [ ] NO    PHYSICAL EXAM:  GENERAL: NAD, wNRB   HEAD:  Atraumatic, Normocephalic  EYES: EOMI, PERRLA, conjunctiva and sclera clear  ENMT: No tonsillar erythema, exudates, or enlargement; Moist mucous membranes, Good dentition, No lesions  NECK: Supple, No JVD, Normal thyroid  NERVOUS SYSTEM:  Alert & Oriented X3, No focal deficit   CHEST/LUNG: Good air entry bilateral with no  rales, rhonchi, wheezing, or rubs  HEART: Regular rate and rhythm; No murmurs, rubs, or gallops  ABDOMEN: Soft, Nontender, Nondistended; Bowel sounds present  EXTREMITIES:  2+ Peripheral Pulses, No clubbing, cyanosis, or edema  SKIN: No rashes or lesions    Care Discussed with Consultants/Other Providers [ x] YES  [ ] NO

## 2020-03-31 NOTE — DISCHARGE NOTE PROVIDER - NSDCCPCAREPLAN_GEN_ALL_CORE_FT
PRINCIPAL DISCHARGE DIAGNOSIS  Diagnosis: Infection due to 2019 novel coronavirus  Assessment and Plan of Treatment: PRINCIPAL DISCHARGE DIAGNOSIS  Diagnosis: Infection due to 2019 novel coronavirus  Assessment and Plan of Treatment: You were found to have COVID-19 (the new coronavirus). You required oxygen while in the hospital and are now doing well breathing on your own. You were started on Plaquenil, a medication that may help with COVID-19 management. Continue as prescribed. Take Tylenol 500mg-1000mg every 8 hours as needed for pain or fever. See instructions below. PRINCIPAL DISCHARGE DIAGNOSIS  Diagnosis: Infection due to 2019 novel coronavirus  Assessment and Plan of Treatment: You were found to have COVID-19 (the new coronavirus). You required oxygen while in the hospital and are now doing well breathing on your own. You were started on Plaquenil, a medication that may help with COVID-19 management. Continue as prescribed. Take Tylenol 500mg-1000mg every 8 hours as needed for pain or fever. See instructions below.  Return to the hospital for shortness of breath that does not resolve. PRINCIPAL DISCHARGE DIAGNOSIS  Diagnosis: Infection due to 2019 novel coronavirus  Assessment and Plan of Treatment: You were found to have COVID-19 (the new coronavirus). You required oxygen while in the hospital and are now doing well breathing on your own. You were started on Plaquenil, a medication that may help with COVID-19 management. Continue as prescribed. Take Tylenol 500mg-1000mg every 8 hours as needed for pain or fever. See instructions below. Your liver function tests were elevated likely from your infection. Follow up with your PCP for repeat bloodwork when able.   Return to the hospital for shortness of breath that does not resolve.

## 2020-03-31 NOTE — DISCHARGE NOTE PROVIDER - NSDCMRMEDTOKEN_GEN_ALL_CORE_FT
escitalopram 10 mg oral tablet: 1 tab(s) orally once a day  fenofibrate: tab(s) orally once a day  glipiZIDE 2.5 mg oral tablet, extended release: 1 tab(s) orally once a day  metFORMIN 500 mg oral tablet: 1 tab(s) orally 2 times a day escitalopram 10 mg oral tablet: 1 tab(s) orally once a day  fenofibrate: tab(s) orally once a day  glipiZIDE 2.5 mg oral tablet, extended release: 1 tab(s) orally once a day  metFORMIN 500 mg oral tablet: 1 tab(s) orally 2 times a day  Plaquenil 200 mg oral tablet: 1 tab(s) orally 2 times a day for COVID-19 diagnosis   Tylenol Extra Strength 500 mg oral tablet: Take 1-2 tabs orally every 8 hours as needed for fever or pain Combivent Respimat 20 mcg-100 mcg/inh inhalation aerosol: 1 puff(s) inhaled every 6 hours as needed for shortness of breath  escitalopram 10 mg oral tablet: 1 tab(s) orally once a day  fenofibrate: tab(s) orally once a day  glipiZIDE 2.5 mg oral tablet, extended release: 1 tab(s) orally once a day  metFORMIN 500 mg oral tablet: 1 tab(s) orally 2 times a day  Plaquenil 200 mg oral tablet: 1 tab(s) orally 2 times a day for COVID-19 diagnosis   Tylenol Extra Strength 500 mg oral tablet: Take 1-2 tabs orally every 8 hours as needed for fever or pain

## 2020-03-31 NOTE — DISCHARGE NOTE PROVIDER - NSDCFUADDINST_GEN_ALL_CORE_FT
You were found to be positive COVID 19 virus. Please follow full instructions listed in your paperwork. Please self quarantine at home for 14 days from discharge and stay 6 feet away minimum from other individuals or animals. Do not go to work, school, public areas, or use public transportation. Restrict outside activity except for  medical care. Please call ahead if you have an appointment with your doctor to inform them of your COVID positive status. Always wear a facemask at home. Cover your sneezes and coughs, and wash hands with soap and water for at least 20 seconds frequently. Avoid sharing personal household items. Clean all surfaces where you contact frequently such as counters and doorknobs frequently.       If you have any worsening breathing, faster breathing or trouble with breathing call 911 immediately or your healthcare provider ahead of time and wear facemask before being seen by medical personnel.     Take Tylenol for your fevers. Please follow state and local rules and regulations regarding coming off of isolation. You have been diagnosed with the COVID-19 virus during your hospital stay. You must self quarantine to complete a 14 day time period.  Monitor for fevers, shortness of breath and cough primarily.  Monitor your temperature daily to not any changes and increases.      It has been determined that you no longer need hospitalization and can recover while remaining in self-quarantine at home. You should follow the prevention steps below until a healthcare provider or local or state health department says you can return to your normal activities.    1. You should restrict activities outside your home, except for getting medical care.  2. Do not go to work, school, or public areas.  3. Avoid using public transportation, ride-sharing, or taxis.  4. Separate yourself from other people and animals in your home.  5. Call ahead before visiting your doctor.  6. Wear a facemask.  7. Cover your coughs and sneezes.  8. Clean your hands often.  9. Avoid sharing personal household items.  10. Clean all “high-touch” surfaces everyday.  11. Monitor your symptoms.  If you have a medical emergency and need to call 911, notify the dispatch personnel that you have COVID-19 If possible, put on a facemask before emergency medical services arrive.  12. Stopping home isolation.  Patients with confirmed COVID-19 should remain under home isolation precautions for 14 days since the positive COVID-19 test and until the risk of secondary transmission to others is thought to be low. The decision to discontinue home isolation precautions should be made on a case-by-case basis, in consultation with the primary health care provide. You can call the [facility] [phone] or [facility] at [phone] for further information about COVID-19.

## 2020-03-31 NOTE — DISCHARGE NOTE PROVIDER - NSDCFUADDAPPT_GEN_ALL_CORE_FT
Please follow up with your primary care provider, Dr. Vance [phone] within 1 week of discharge from the hospital. If you do not have a primary care provider please follow up with the [facility], call [phone]

## 2020-04-01 LAB
ANION GAP SERPL CALC-SCNC: 10 MMO/L — SIGNIFICANT CHANGE UP (ref 7–14)
BUN SERPL-MCNC: 18 MG/DL — SIGNIFICANT CHANGE UP (ref 7–23)
CALCIUM SERPL-MCNC: 9.1 MG/DL — SIGNIFICANT CHANGE UP (ref 8.4–10.5)
CHLORIDE SERPL-SCNC: 104 MMOL/L — SIGNIFICANT CHANGE UP (ref 98–107)
CO2 SERPL-SCNC: 25 MMOL/L — SIGNIFICANT CHANGE UP (ref 22–31)
CREAT SERPL-MCNC: 1.09 MG/DL — SIGNIFICANT CHANGE UP (ref 0.5–1.3)
CRP SERPL-MCNC: 107.7 MG/L — HIGH
GLUCOSE BLDC GLUCOMTR-MCNC: 101 MG/DL — HIGH (ref 70–99)
GLUCOSE BLDC GLUCOMTR-MCNC: 112 MG/DL — HIGH (ref 70–99)
GLUCOSE BLDC GLUCOMTR-MCNC: 121 MG/DL — HIGH (ref 70–99)
GLUCOSE BLDC GLUCOMTR-MCNC: 139 MG/DL — HIGH (ref 70–99)
GLUCOSE SERPL-MCNC: 107 MG/DL — HIGH (ref 70–99)
HCT VFR BLD CALC: 30.7 % — LOW (ref 39–50)
HGB BLD-MCNC: 10.2 G/DL — LOW (ref 13–17)
MAGNESIUM SERPL-MCNC: 2 MG/DL — SIGNIFICANT CHANGE UP (ref 1.6–2.6)
MCHC RBC-ENTMCNC: 29.8 PG — SIGNIFICANT CHANGE UP (ref 27–34)
MCHC RBC-ENTMCNC: 33.2 % — SIGNIFICANT CHANGE UP (ref 32–36)
MCV RBC AUTO: 89.8 FL — SIGNIFICANT CHANGE UP (ref 80–100)
NRBC # FLD: 0 K/UL — SIGNIFICANT CHANGE UP (ref 0–0)
PHOSPHATE SERPL-MCNC: 2.8 MG/DL — SIGNIFICANT CHANGE UP (ref 2.5–4.5)
PLATELET # BLD AUTO: 157 K/UL — SIGNIFICANT CHANGE UP (ref 150–400)
PMV BLD: 12.2 FL — SIGNIFICANT CHANGE UP (ref 7–13)
POTASSIUM SERPL-MCNC: 3.9 MMOL/L — SIGNIFICANT CHANGE UP (ref 3.5–5.3)
POTASSIUM SERPL-SCNC: 3.9 MMOL/L — SIGNIFICANT CHANGE UP (ref 3.5–5.3)
RBC # BLD: 3.42 M/UL — LOW (ref 4.2–5.8)
RBC # FLD: 11.9 % — SIGNIFICANT CHANGE UP (ref 10.3–14.5)
SODIUM SERPL-SCNC: 139 MMOL/L — SIGNIFICANT CHANGE UP (ref 135–145)
WBC # BLD: 6.21 K/UL — SIGNIFICANT CHANGE UP (ref 3.8–10.5)
WBC # FLD AUTO: 6.21 K/UL — SIGNIFICANT CHANGE UP (ref 3.8–10.5)

## 2020-04-01 PROCEDURE — 93010 ELECTROCARDIOGRAM REPORT: CPT

## 2020-04-01 RX ORDER — IPRATROPIUM/ALBUTEROL SULFATE 18-103MCG
1 AEROSOL WITH ADAPTER (GRAM) INHALATION
Refills: 0 | Status: DISCONTINUED | OUTPATIENT
Start: 2020-04-01 | End: 2020-04-02

## 2020-04-01 RX ADMIN — Medication 200 MILLIGRAM(S): at 17:49

## 2020-04-01 RX ADMIN — Medication 100 MILLIGRAM(S): at 11:44

## 2020-04-01 RX ADMIN — ESCITALOPRAM OXALATE 10 MILLIGRAM(S): 10 TABLET, FILM COATED ORAL at 11:44

## 2020-04-01 RX ADMIN — ENOXAPARIN SODIUM 40 MILLIGRAM(S): 100 INJECTION SUBCUTANEOUS at 04:40

## 2020-04-01 RX ADMIN — Medication 100 MILLIGRAM(S): at 04:40

## 2020-04-01 RX ADMIN — Medication 1 PUFF(S): at 17:49

## 2020-04-01 RX ADMIN — Medication 100 MILLIGRAM(S): at 20:38

## 2020-04-01 RX ADMIN — Medication 400 MILLIGRAM(S): at 04:40

## 2020-04-01 RX ADMIN — Medication 1 PUFF(S): at 22:24

## 2020-04-01 NOTE — PROGRESS NOTE ADULT - ASSESSMENT
65 year old male with DM2, HLD presents to the ED with fever and cough for about 4 days. COVID19 result pending.      Problem/Plan - 1:  ·  Problem: Acute respiratory failure with Hypoxia .  Plan: Oxygen NC  . RVP negative.  - COVID19 positive .   - Hemodynamically stable, not requiring supplemental O2, continue to monitor.      Problem/Plan - 2:  ·  Problem: Transaminitis. Plan: Hepatocellular pattern, AST 80, ALT 69. No abdominal discomfort. No prior lab for review. Suspect possibly 2/2 viral illness above. Continue to monitor     Problem/Plan - 3:  ·  Problem: HLD (hyperlipidemia). Plan: Uncertain of home fenofibrate dose, pt attempting to clarify, resume once dose clarified.    #Depression/anxiety: No acute issues, continue home escitalopram 10mg qd.     Problem/Plan - 4:  ·  Problem: DM (diabetes mellitus). Plan: Holding home oral agents, ISS and glucose checks at this time.     Problem/Plan - 5:  ·  Problem: DVT prophylaxis. Plan: Lovenox subcutaneous.     Problem/Plan - 6:  Problem: Discharge planning issues. Plan: Dc planning home .

## 2020-04-01 NOTE — PROGRESS NOTE ADULT - SUBJECTIVE AND OBJECTIVE BOX
INTERVAL HPI/OVERNIGHT EVENTS: I feel better.   Vital Signs Last 24 Hrs  T(C): 36.8 (01 Apr 2020 20:54), Max: 36.9 (01 Apr 2020 04:38)  T(F): 98.2 (01 Apr 2020 20:54), Max: 98.4 (01 Apr 2020 04:38)  HR: 62 (01 Apr 2020 20:54) (62 - 80)  BP: 119/71 (01 Apr 2020 20:54) (119/71 - 131/74)  BP(mean): --  RR: 18 (01 Apr 2020 20:54) (18 - 18)  SpO2: 96% (01 Apr 2020 20:54) (92% - 97%)  I&O's Summary    31 Mar 2020 07:01  -  01 Apr 2020 07:00  --------------------------------------------------------  IN: 200 mL / OUT: 0 mL / NET: 200 mL      MEDICATIONS  (STANDING):  albuterol/ipratropium (CFC free) Inhaler. 1 Puff(s) Inhalation four times a day  benzonatate 100 milliGRAM(s) Oral three times a day  dextrose 5%. 1000 milliLiter(s) (50 mL/Hr) IV Continuous <Continuous>  dextrose 50% Injectable 12.5 Gram(s) IV Push once  dextrose 50% Injectable 25 Gram(s) IV Push once  dextrose 50% Injectable 25 Gram(s) IV Push once  enoxaparin Injectable 40 milliGRAM(s) SubCutaneous every 24 hours  escitalopram 10 milliGRAM(s) Oral daily  hydroxychloroquine   Oral   hydroxychloroquine 200 milliGRAM(s) Oral every 12 hours  insulin lispro (HumaLOG) corrective regimen sliding scale   SubCutaneous three times a day before meals  insulin lispro (HumaLOG) corrective regimen sliding scale   SubCutaneous at bedtime    MEDICATIONS  (PRN):  acetaminophen   Tablet .. 650 milliGRAM(s) Oral every 6 hours PRN Temp greater or equal to 38C (100.4F)  dextrose 40% Gel 15 Gram(s) Oral once PRN Blood Glucose LESS THAN 70 milliGRAM(s)/deciliter  glucagon  Injectable 1 milliGRAM(s) IntraMuscular once PRN Glucose LESS THAN 70 milligrams/deciliter  guaiFENesin   Syrup  (Sugar-Free) 100 milliGRAM(s) Oral every 6 hours PRN Cough    LABS:                        10.2   6.21  )-----------( 157      ( 01 Apr 2020 07:12 )             30.7     04-01    139  |  104  |  18  ----------------------------<  107<H>  3.9   |  25  |  1.09    Ca    9.1      01 Apr 2020 07:12  Phos  2.8     04-01  Mg     2.0     04-01    TPro  7.1  /  Alb  3.8  /  TBili  0.5  /  DBili  x   /  AST  67<H>  /  ALT  60<H>  /  AlkPhos  79  03-31        CAPILLARY BLOOD GLUCOSE      POCT Blood Glucose.: 112 mg/dL (01 Apr 2020 17:44)  POCT Blood Glucose.: 121 mg/dL (01 Apr 2020 12:40)  POCT Blood Glucose.: 101 mg/dL (01 Apr 2020 08:21)  POCT Blood Glucose.: 110 mg/dL (31 Mar 2020 22:30)          REVIEW OF SYSTEMS:  CONSTITUTIONAL: No fever, weight loss, or fatigue  EYES: No eye pain, visual disturbances, or discharge  ENMT:  No difficulty hearing, tinnitus, vertigo; No sinus or throat pain  NECK: No pain or stiffness  RESPIRATORY: No cough, wheezing, chills or hemoptysis; No shortness of breath  CARDIOVASCULAR: No chest pain, palpitations, dizziness, or leg swelling  GASTROINTESTINAL: No abdominal or epigastric pain. No nausea, vomiting, or hematemesis; No diarrhea or constipation. No melena or hematochezia.  GENITOURINARY: No dysuria, frequency, hematuria, or incontinence  NEUROLOGICAL: No headaches, memory loss, loss of strength, numbness, or tremors      Consultant(s) Notes Reviewed:  [x ] YES  [ ] NO    PHYSICAL EXAM:  GENERAL: NAD, NC Oxygen   HEAD:  Atraumatic, Normocephalic  EYES: EOMI, PERRLA, conjunctiva and sclera clear  ENMT: No tonsillar erythema, exudates, or enlargement; Moist mucous membranes, Good dentition, No lesions  NECK: Supple, No JVD, Normal thyroid  NERVOUS SYSTEM:  Alert & Oriented X3, No focal deficit   CHEST/LUNG: Good air entry bilateral with no  rales, rhonchi, wheezing, or rubs  HEART: Regular rate and rhythm; No murmurs, rubs, or gallops  ABDOMEN: Soft, Nontender, Nondistended; Bowel sounds present  EXTREMITIES:  2+ Peripheral Pulses, No clubbing, cyanosis, or edema    Care Discussed with Consultants/Other Providers [ x] YES  [ ] NO

## 2020-04-02 ENCOUNTER — TRANSCRIPTION ENCOUNTER (OUTPATIENT)
Age: 65
End: 2020-04-02

## 2020-04-02 VITALS
DIASTOLIC BLOOD PRESSURE: 66 MMHG | SYSTOLIC BLOOD PRESSURE: 127 MMHG | HEART RATE: 73 BPM | TEMPERATURE: 98 F | OXYGEN SATURATION: 96 % | RESPIRATION RATE: 17 BRPM

## 2020-04-02 LAB
ALBUMIN SERPL ELPH-MCNC: 3.4 G/DL — SIGNIFICANT CHANGE UP (ref 3.3–5)
ALBUMIN SERPL ELPH-MCNC: 3.5 G/DL — SIGNIFICANT CHANGE UP (ref 3.3–5)
ALP SERPL-CCNC: 141 U/L — HIGH (ref 40–120)
ALP SERPL-CCNC: 147 U/L — HIGH (ref 40–120)
ALT FLD-CCNC: 70 U/L — HIGH (ref 4–41)
ALT FLD-CCNC: 74 U/L — HIGH (ref 4–41)
ANION GAP SERPL CALC-SCNC: 12 MMO/L — SIGNIFICANT CHANGE UP (ref 7–14)
AST SERPL-CCNC: 76 U/L — HIGH (ref 4–40)
AST SERPL-CCNC: 83 U/L — HIGH (ref 4–40)
BASOPHILS # BLD AUTO: 0.02 K/UL — SIGNIFICANT CHANGE UP (ref 0–0.2)
BASOPHILS NFR BLD AUTO: 0.3 % — SIGNIFICANT CHANGE UP (ref 0–2)
BILIRUB DIRECT SERPL-MCNC: 0.3 MG/DL — HIGH (ref 0.1–0.2)
BILIRUB SERPL-MCNC: 0.6 MG/DL — SIGNIFICANT CHANGE UP (ref 0.2–1.2)
BILIRUB SERPL-MCNC: 0.7 MG/DL — SIGNIFICANT CHANGE UP (ref 0.2–1.2)
BUN SERPL-MCNC: 17 MG/DL — SIGNIFICANT CHANGE UP (ref 7–23)
CALCIUM SERPL-MCNC: 9.5 MG/DL — SIGNIFICANT CHANGE UP (ref 8.4–10.5)
CHLORIDE SERPL-SCNC: 103 MMOL/L — SIGNIFICANT CHANGE UP (ref 98–107)
CO2 SERPL-SCNC: 23 MMOL/L — SIGNIFICANT CHANGE UP (ref 22–31)
CREAT SERPL-MCNC: 1.23 MG/DL — SIGNIFICANT CHANGE UP (ref 0.5–1.3)
EOSINOPHIL # BLD AUTO: 0.15 K/UL — SIGNIFICANT CHANGE UP (ref 0–0.5)
EOSINOPHIL NFR BLD AUTO: 2.2 % — SIGNIFICANT CHANGE UP (ref 0–6)
GLUCOSE BLDC GLUCOMTR-MCNC: 119 MG/DL — HIGH (ref 70–99)
GLUCOSE BLDC GLUCOMTR-MCNC: 139 MG/DL — HIGH (ref 70–99)
GLUCOSE BLDC GLUCOMTR-MCNC: 166 MG/DL — HIGH (ref 70–99)
GLUCOSE SERPL-MCNC: 116 MG/DL — HIGH (ref 70–99)
HCT VFR BLD CALC: 32 % — LOW (ref 39–50)
HGB BLD-MCNC: 10.7 G/DL — LOW (ref 13–17)
IMM GRANULOCYTES NFR BLD AUTO: 0.3 % — SIGNIFICANT CHANGE UP (ref 0–1.5)
LYMPHOCYTES # BLD AUTO: 2.12 K/UL — SIGNIFICANT CHANGE UP (ref 1–3.3)
LYMPHOCYTES # BLD AUTO: 30.7 % — SIGNIFICANT CHANGE UP (ref 13–44)
MAGNESIUM SERPL-MCNC: 1.9 MG/DL — SIGNIFICANT CHANGE UP (ref 1.6–2.6)
MCHC RBC-ENTMCNC: 29.6 PG — SIGNIFICANT CHANGE UP (ref 27–34)
MCHC RBC-ENTMCNC: 33.4 % — SIGNIFICANT CHANGE UP (ref 32–36)
MCV RBC AUTO: 88.4 FL — SIGNIFICANT CHANGE UP (ref 80–100)
MONOCYTES # BLD AUTO: 0.64 K/UL — SIGNIFICANT CHANGE UP (ref 0–0.9)
MONOCYTES NFR BLD AUTO: 9.3 % — SIGNIFICANT CHANGE UP (ref 2–14)
NEUTROPHILS # BLD AUTO: 3.95 K/UL — SIGNIFICANT CHANGE UP (ref 1.8–7.4)
NEUTROPHILS NFR BLD AUTO: 57.2 % — SIGNIFICANT CHANGE UP (ref 43–77)
NRBC # FLD: 0 K/UL — SIGNIFICANT CHANGE UP (ref 0–0)
PHOSPHATE SERPL-MCNC: 3.2 MG/DL — SIGNIFICANT CHANGE UP (ref 2.5–4.5)
PLATELET # BLD AUTO: 180 K/UL — SIGNIFICANT CHANGE UP (ref 150–400)
PMV BLD: 12 FL — SIGNIFICANT CHANGE UP (ref 7–13)
POTASSIUM SERPL-MCNC: 3.9 MMOL/L — SIGNIFICANT CHANGE UP (ref 3.5–5.3)
POTASSIUM SERPL-SCNC: 3.9 MMOL/L — SIGNIFICANT CHANGE UP (ref 3.5–5.3)
PROT SERPL-MCNC: 6.5 G/DL — SIGNIFICANT CHANGE UP (ref 6–8.3)
PROT SERPL-MCNC: 6.9 G/DL — SIGNIFICANT CHANGE UP (ref 6–8.3)
RBC # BLD: 3.62 M/UL — LOW (ref 4.2–5.8)
RBC # FLD: 11.9 % — SIGNIFICANT CHANGE UP (ref 10.3–14.5)
SODIUM SERPL-SCNC: 138 MMOL/L — SIGNIFICANT CHANGE UP (ref 135–145)
WBC # BLD: 6.9 K/UL — SIGNIFICANT CHANGE UP (ref 3.8–10.5)
WBC # FLD AUTO: 6.9 K/UL — SIGNIFICANT CHANGE UP (ref 3.8–10.5)

## 2020-04-02 RX ORDER — IPRATROPIUM/ALBUTEROL SULFATE 18-103MCG
1 AEROSOL WITH ADAPTER (GRAM) INHALATION
Qty: 1 | Refills: 0
Start: 2020-04-02

## 2020-04-02 RX ORDER — ACETAMINOPHEN 500 MG
1 TABLET ORAL
Qty: 30 | Refills: 0
Start: 2020-04-02

## 2020-04-02 RX ORDER — HYDROXYCHLOROQUINE SULFATE 200 MG
1 TABLET ORAL
Qty: 6 | Refills: 0
Start: 2020-04-02

## 2020-04-02 RX ADMIN — Medication 1 PUFF(S): at 16:26

## 2020-04-02 RX ADMIN — Medication 100 MILLIGRAM(S): at 05:20

## 2020-04-02 RX ADMIN — Medication 1 PUFF(S): at 12:45

## 2020-04-02 RX ADMIN — Medication 100 MILLIGRAM(S): at 12:45

## 2020-04-02 RX ADMIN — ENOXAPARIN SODIUM 40 MILLIGRAM(S): 100 INJECTION SUBCUTANEOUS at 05:20

## 2020-04-02 RX ADMIN — Medication 200 MILLIGRAM(S): at 16:27

## 2020-04-02 RX ADMIN — Medication 1: at 17:16

## 2020-04-02 RX ADMIN — ESCITALOPRAM OXALATE 10 MILLIGRAM(S): 10 TABLET, FILM COATED ORAL at 12:45

## 2020-04-02 RX ADMIN — Medication 200 MILLIGRAM(S): at 05:20

## 2020-04-02 RX ADMIN — Medication 1 PUFF(S): at 05:20

## 2020-04-02 NOTE — DISCHARGE NOTE NURSING/CASE MANAGEMENT/SOCIAL WORK - PATIENT PORTAL LINK FT
You can access the FollowMyHealth Patient Portal offered by [facility] by registering at the following website: [email or web address]. By joining [facility]’s FollowMyHealth portal, you will also be able to view your health information using other applications (apps) compatible with our system.

## 2020-04-02 NOTE — PROGRESS NOTE ADULT - SUBJECTIVE AND OBJECTIVE BOX
INTERVAL HPI/OVERNIGHT EVENTS: I feel better so want to go home.   Vital Signs Last 24 Hrs  T(C): 36.7 (02 Apr 2020 12:47), Max: 37.2 (02 Apr 2020 05:19)  T(F): 98 (02 Apr 2020 12:47), Max: 98.9 (02 Apr 2020 05:19)  HR: 73 (02 Apr 2020 12:47) (60 - 74)  BP: 127/66 (02 Apr 2020 12:47) (109/63 - 127/66)  BP(mean): --  RR: 17 (02 Apr 2020 12:47) (17 - 18)  SpO2: 96% (02 Apr 2020 12:47) (94% - 97%)  I&O's Summary    MEDICATIONS  (STANDING):  albuterol/ipratropium (CFC free) Inhaler. 1 Puff(s) Inhalation four times a day  benzonatate 100 milliGRAM(s) Oral three times a day  dextrose 5%. 1000 milliLiter(s) (50 mL/Hr) IV Continuous <Continuous>  dextrose 50% Injectable 12.5 Gram(s) IV Push once  dextrose 50% Injectable 25 Gram(s) IV Push once  dextrose 50% Injectable 25 Gram(s) IV Push once  enoxaparin Injectable 40 milliGRAM(s) SubCutaneous every 24 hours  escitalopram 10 milliGRAM(s) Oral daily  hydroxychloroquine   Oral   hydroxychloroquine 200 milliGRAM(s) Oral every 12 hours  insulin lispro (HumaLOG) corrective regimen sliding scale   SubCutaneous three times a day before meals  insulin lispro (HumaLOG) corrective regimen sliding scale   SubCutaneous at bedtime    MEDICATIONS  (PRN):  acetaminophen   Tablet .. 650 milliGRAM(s) Oral every 6 hours PRN Temp greater or equal to 38C (100.4F)  dextrose 40% Gel 15 Gram(s) Oral once PRN Blood Glucose LESS THAN 70 milliGRAM(s)/deciliter  glucagon  Injectable 1 milliGRAM(s) IntraMuscular once PRN Glucose LESS THAN 70 milligrams/deciliter  guaiFENesin   Syrup  (Sugar-Free) 100 milliGRAM(s) Oral every 6 hours PRN Cough    LABS:                        10.7   6.90  )-----------( 180      ( 02 Apr 2020 06:33 )             32.0     04-02    138  |  103  |  17  ----------------------------<  116<H>  3.9   |  23  |  1.23    Ca    9.5      02 Apr 2020 06:33  Phos  3.2     04-02  Mg     1.9     04-02    TPro  6.5  /  Alb  3.4  /  TBili  0.6  /  DBili  0.3<H>  /  AST  76<H>  /  ALT  70<H>  /  AlkPhos  147<H>  04-02        CAPILLARY BLOOD GLUCOSE      POCT Blood Glucose.: 166 mg/dL (02 Apr 2020 17:06)  POCT Blood Glucose.: 139 mg/dL (02 Apr 2020 12:18)  POCT Blood Glucose.: 119 mg/dL (02 Apr 2020 08:52)  POCT Blood Glucose.: 139 mg/dL (01 Apr 2020 22:37)          REVIEW OF SYSTEMS:  CONSTITUTIONAL: No fever, weight loss, or fatigue  EYES: No eye pain, visual disturbances, or discharge  ENMT:  No difficulty hearing, tinnitus, vertigo; No sinus or throat pain  NECK: No pain or stiffness  RESPIRATORY: No cough, wheezing, chills or hemoptysis; No shortness of breath  CARDIOVASCULAR: No chest pain, palpitations, dizziness, or leg swelling  GASTROINTESTINAL: No abdominal or epigastric pain. No nausea, vomiting, or hematemesis; No diarrhea or constipation. No melena or hematochezia.  GENITOURINARY: No dysuria, frequency, hematuria, or incontinence  NEUROLOGICAL: No headaches, memory loss, loss of strength, numbness, or tremors      RADIOLOGY & ADDITIONAL TESTS:    Consultant(s) Notes Reviewed:  [x ] YES  [ ] NO    PHYSICAL EXAM:  GENERAL: NAD, well-groomed, well-developed,not in any distress ,  HEAD:  Atraumatic, Normocephalic  EYES: EOMI, PERRLA, conjunctiva and sclera clear  ENMT: No tonsillar erythema, exudates, or enlargement; Moist mucous membranes, Good dentition, No lesions  NECK: Supple, No JVD, Normal thyroid  NERVOUS SYSTEM:  Alert & Oriented X3, No focal deficit   CHEST/LUNG: Good air entry bilateral with no  rales, rhonchi, wheezing, or rubs  HEART: Regular rate and rhythm; No murmurs, rubs, or gallops  ABDOMEN: Soft, Nontender, Nondistended; Bowel sounds present  EXTREMITIES:  2+ Peripheral Pulses, No clubbing, cyanosis, or edema  SKIN: No rashes or lesions    Care Discussed with Consultants/Other Providers [ x] YES  [ ] NO

## 2020-04-02 NOTE — CHART NOTE - NSCHARTNOTEFT_GEN_A_CORE
Patient successfully weaned off NC. O2 saturation on room air with ambulation 94% and patient tolerated activity well without SOB.

## 2020-06-24 ENCOUNTER — TRANSCRIPTION ENCOUNTER (OUTPATIENT)
Age: 65
End: 2020-06-24

## 2020-06-25 ENCOUNTER — TRANSCRIPTION ENCOUNTER (OUTPATIENT)
Age: 65
End: 2020-06-25

## 2021-03-15 PROBLEM — E78.5 HYPERLIPIDEMIA, UNSPECIFIED: Chronic | Status: ACTIVE | Noted: 2020-03-30

## 2021-03-15 PROBLEM — E11.9 TYPE 2 DIABETES MELLITUS WITHOUT COMPLICATIONS: Chronic | Status: ACTIVE | Noted: 2020-03-30

## 2021-03-18 ENCOUNTER — APPOINTMENT (OUTPATIENT)
Dept: SURGERY | Facility: CLINIC | Age: 66
End: 2021-03-18
Payer: MEDICARE

## 2021-03-18 VITALS
TEMPERATURE: 97.3 F | DIASTOLIC BLOOD PRESSURE: 76 MMHG | BODY MASS INDEX: 26.36 KG/M2 | SYSTOLIC BLOOD PRESSURE: 161 MMHG | WEIGHT: 164 LBS | HEART RATE: 56 BPM | HEIGHT: 66 IN

## 2021-03-18 DIAGNOSIS — K64.5 PERIANAL VENOUS THROMBOSIS: ICD-10-CM

## 2021-03-18 PROCEDURE — 99072 ADDL SUPL MATRL&STAF TM PHE: CPT

## 2021-03-18 PROCEDURE — 99203 OFFICE O/P NEW LOW 30 MIN: CPT | Mod: 25

## 2021-03-18 PROCEDURE — 45300 PROCTOSIGMOIDOSCOPY DX: CPT

## 2021-03-18 RX ORDER — HYDROCORTISONE 2.5% 25 MG/G
2.5 CREAM TOPICAL TWICE DAILY
Qty: 1 | Refills: 1 | Status: ACTIVE | COMMUNITY
Start: 2021-03-18 | End: 1900-01-01

## 2021-03-18 NOTE — REVIEW OF SYSTEMS
[Fever] : no fever [Chills] : no chills [Feeling Poorly] : not feeling poorly [Chest Pain] : no chest pain [Lower Ext Edema] : no extremity edema [Shortness Of Breath] : no shortness of breath [Cough] : no cough [Abdominal Pain] : no abdominal pain [Constipation] : no constipation [Diarrhea] : no diarrhea [Dysuria] : no dysuria [Hesitancy] : no urinary hesitancy [Arthralgias] : no arthralgias [Joint Pain] : no joint pain [Skin Lesions] : no skin lesions [Skin Wound] : no skin wound [Dizziness] : no dizziness [Anxiety] : no anxiety [Muscle Weakness] : no muscle weakness [Swollen Glands] : no swollen glands [FreeTextEntry7] : has discomfort with BM at times, with some bleeding after a BM when wiping

## 2021-03-18 NOTE — PLAN
[FreeTextEntry1] : findings were discussed w the patient \par he was found to have a thrombosed hemorrhoid which likely c/w bleeding after BM \par \par surgical intervention not recommended \par high fiber diet /PO fluid intake \par sitz baths \par \par patient will follow up if needed. Warning signs, follow up, and restrictions were discussed with the patient.\par

## 2021-03-18 NOTE — ASSESSMENT
[FreeTextEntry1] : Patient is a 66 y.o M who presents w cc rectal bleeding after a BM with some discomfort, c/o hemorrhoids, on and off. Exam findings as described below; \par \par Rigid sigmoidoscopy\par Patient was placed in left lateral position. After a digital rectal exam, the sigmoidoscope was inserted gently.\par Scope was passed to 15  cm. - stool seen beyond \par Findings: no masses or polyps seen, no bleeding; grade II hemorrhoids /thrombosed hemorrhoid left side \par \par

## 2021-03-18 NOTE — PHYSICAL EXAM
[Normal Breath Sounds] : Normal breath sounds [Normal Rate and Rhythm] : normal rate and rhythm [No Rash or Lesion] : No rash or lesion [Alert] : alert [Oriented to Person] : oriented to person [Oriented to Place] : oriented to place [Oriented to Time] : oriented to time [Calm] : calm [JVD] : no jugular venous distention  [de-identified] : A/Ox3; NAD. appears comfortable [de-identified] : EOMI; sclera anicteric. Nasal mucosa pink, septum midline. Oral mucosa pink. Tongue midline, Pharynx without exudates. [de-identified] : Abd is soft, nondistended, no rebound or guarding. No abdominal masses. No abdominal tenderness.\par  [de-identified] : normal sphincter tone, no masses felt. no bleeding, no tenderness  [de-identified] : +ROM, no joint swelling

## 2021-05-03 ENCOUNTER — RX RENEWAL (OUTPATIENT)
Age: 66
End: 2021-05-03

## 2021-12-08 ENCOUNTER — APPOINTMENT (OUTPATIENT)
Dept: UROLOGY | Facility: CLINIC | Age: 66
End: 2021-12-08
Payer: MEDICARE

## 2021-12-08 DIAGNOSIS — F52.21 MALE ERECTILE DISORDER: ICD-10-CM

## 2021-12-08 PROCEDURE — 99213 OFFICE O/P EST LOW 20 MIN: CPT

## 2021-12-08 RX ORDER — SILDENAFIL 100 MG/1
100 TABLET, FILM COATED ORAL
Qty: 30 | Refills: 1 | Status: ACTIVE | COMMUNITY
Start: 2021-12-08 | End: 1900-01-01

## 2021-12-13 NOTE — HISTORY OF PRESENT ILLNESS
[FreeTextEntry1] : Cc ed\par Erectile Dysfunction\par Patient complains of erectile dysfunction. Onset of dysfunction was  years ago and was gradual in onset.  Patient states the nature of difficulty is both attaining and maintaining erection. Full erections occur never. Partial erections occur never. Libido is  affected. Risk factors for ED include antihypertensive medications, dm lexapro, hyperlipid. Patient denies history of cardiovascular disease a. Patient's expectations as to sexual function good.  . Previous treatment of ED includes sildenafil reasonable results \par labs normal \par

## 2023-08-30 NOTE — ED ADULT NURSE NOTE - OBJECTIVE STATEMENT
08/28/2023  Value: 25          Ref range: 22 - 29 mmol/L     Status: Final  Anion Gap                                     Date: 08/28/2023  Value: 11          Ref range: 7 - 16 mmol/L      Status: Final  Ventricular Rate                              Date: 08/28/2023  Value: 69          Ref range: BPM                Status: Final  Atrial Rate                                   Date: 08/28/2023  Value: 69          Ref range: BPM                Status: Final  P-R Interval                                  Date: 08/28/2023  Value: 158         Ref range: ms                 Status: Final  QRS Duration                                  Date: 08/28/2023  Value: 80          Ref range: ms                 Status: Final  Q-T Interval                                  Date: 08/28/2023  Value: 380         Ref range: ms                 Status: Final  QTc Calculation (Bazett)                      Date: 08/28/2023  Value: 407         Ref range: ms                 Status: Final  P Axis                                        Date: 08/28/2023  Value: 84          Ref range: degrees            Status: Final  R Axis                                        Date: 08/28/2023  Value: 32          Ref range: degrees            Status: Final  T Axis                                        Date: 08/28/2023  Value: 66          Ref range: degrees            Status: Final  ------------    Radiology last 7 days:  CT LUMBAR SPINE WO CONTRAST    Result Date: 8/25/2023  1. New mild superior and inferior endplate compression deformity at the T12 level. 2. Similar appearing compression fractures at the L3 through L5 levels. 3. Severe osteopenia. 4. Degenerative disease at all levels. MRI LUMBAR SPINE WO CONTRAST    Result Date: 8/27/2023  1. Acute to subacute fractures involving superior and inferior endplate of K28. 2. Acute to subacute fractures involving superior endplates of L3 and L4 with depression of approximately 20% and mild retropulsion.  3. cough, SOB patient complaining of fevr and cough x 1 week. feeling very short of breath today. unable to talk in full sentences.

## 2023-09-11 NOTE — ED ADULT NURSE NOTE - PRIMARY CARE PROVIDER
pmd Dutasteride Pregnancy And Lactation Text: This medication is absolutely contraindicated in women, especially during pregnancy and breast feeding. Feminization of male fetuses is possible if taking while pregnant.

## 2023-10-18 NOTE — ED PROVIDER NOTE - INPATIENT RESIDENT/ACP NOTIFIED
Detail Level: Detailed Detail Level: Zone Sunscreen Recommendations: Encouraged use of zinc based sunscreen, wide brimmed hats, and SPF protected clothing while in the sun, as it has been proven to prevent further photo damage from the sun. MAR

## 2023-11-20 ENCOUNTER — RX RENEWAL (OUTPATIENT)
Age: 68
End: 2023-11-20